# Patient Record
Sex: MALE | Race: WHITE
[De-identification: names, ages, dates, MRNs, and addresses within clinical notes are randomized per-mention and may not be internally consistent; named-entity substitution may affect disease eponyms.]

---

## 2017-05-16 ENCOUNTER — HOSPITAL ENCOUNTER (EMERGENCY)
Dept: HOSPITAL 17 - NEPK | Age: 22
LOS: 1 days | Discharge: HOME | End: 2017-05-17
Payer: SELF-PAY

## 2017-05-16 VITALS — WEIGHT: 167.55 LBS | BODY MASS INDEX: 31.63 KG/M2 | HEIGHT: 61 IN

## 2017-05-16 VITALS
DIASTOLIC BLOOD PRESSURE: 71 MMHG | OXYGEN SATURATION: 100 % | TEMPERATURE: 98.7 F | SYSTOLIC BLOOD PRESSURE: 118 MMHG | RESPIRATION RATE: 16 BRPM | HEART RATE: 90 BPM

## 2017-05-16 DIAGNOSIS — K02.9: ICD-10-CM

## 2017-05-16 DIAGNOSIS — F17.210: ICD-10-CM

## 2017-05-16 DIAGNOSIS — K08.89: Primary | ICD-10-CM

## 2017-05-16 PROCEDURE — 96372 THER/PROPH/DIAG INJ SC/IM: CPT

## 2017-05-16 PROCEDURE — 99282 EMERGENCY DEPT VISIT SF MDM: CPT

## 2017-05-16 NOTE — PD
Physical Exam


Time Seen by Provider:  22:28


Narrative


21 y/o male here with dental pain for 4 months, worse for the past two weeks.








Vital signs reviewed.


Seen at triage desk.  Awaiting bed placement.





Data


Data


Last Documented VS








Vital Signs








  Date Time  Temp Pulse Resp B/P Pulse Ox O2 Delivery O2 Flow Rate FiO2


 


5/16/17 22:28 98.7 90 16 118/71 100   














MDM


Medical Record Reviewed:  Yes


Supervised Visit with AVERY:  No


Scripts


No Active Prescriptions or Reported Meds








Everett Mosqueda May 16, 2017 22:29

## 2017-05-16 NOTE — PD
HPI


Chief Complaint:  Oral / Dental Pain or Problem


Time Seen by Provider:  23:08


Travel History


International Travel<30 days:  No


Contact w/Intl Traveler<30days:  No


Traveled to known affect area:  No





History of Present Illness


HPI


22-year-old male presents to [facility] department for evaluation of dental pain.  

Patient states it is associated with his wisdom teeth.  He states that his wasn'

t either growing in incorrectly causing his teeth to crack and have cavities.  

This is resulting in him having significant pain.  Patient states his pain has 

been ongoing intermittently for a long time but worse over the last 5-6 days.  

He states it is preventing him from going to work.  Pain is a 10 out of 10, 

throbbing, constant.  Denies any trauma.  He has no other symptoms to report.





PFSH


Past Medical History


ADD:  Yes


ADHD:  Yes (ADHD)


Anxiety:  Yes


Depression:  Yes


Cancer:  No


Cardiovascular Problems:  No


Developmental Delay:  No


Diabetes:  No


Diminished Hearing:  No


Psychiatric:  Yes (PTSD, ANXIETY, DEPRESSION)


Immunizations Current:  Yes


Migraines:  No


Seizures:  No


Thyroid Disease:  No


Ulcer:  No





Past Surgical History


Oral Surgery:  Yes (DENTAL)


Other Surgery:  No





Social History


Alcohol Use:  No


Tobacco Use:  Yes (1/2 PACK DAILY)


Substance Use:  No





Allergies-Medications


(Allergen,Severity, Reaction):  


Coded Allergies:  


     Sulfa (Verified  Allergy, Severe, RASH, 5/16/17)


Reported Meds & Prescriptions





Reported Meds & Active Scripts


Active


Ultram (Tramadol HCl) 50 Mg Tab 50 Mg PO Q6H PRN


Ibuprofen 600 Mg Tab 600 Mg PO Q8HR PRN


Penicillin V Potassium 500 Mg Tab 500 Mg PO Q6H 10 Days


Peridex Liq (Chlorhexidine Gluconate (Mouth) Liq) 0.12% Soln 15 Ml SWISH-SPIT 

BID








Review of Systems


Except as stated in HPI:  all other systems reviewed are Neg





Physical Exam


Narrative


GENERAL: Well-nourished, well-developed male patient in no acute distress


SKIN: Focused skin assessment warm/dry.


HEAD: Normocephalic.  Without erythema or edema


EYES: No scleral icterus. No injection or drainage. 


DENTAL: No loose teeth.  Patient has significantly decayed third molars 

bilateral top and bottom.  Gingiva is erythematous and edematous without any 

appreciable abscess.  No malocclusion.


NECK: Supple, trachea midline. No JVD or lymphadenopathy.


CARDIOVASCULAR: Regular rate and rhythm without murmurs, gallops, or rubs. 


RESPIRATORY: Breath sounds equal bilaterally. No accessory muscle use.


MUSCULOSKELETAL: No cyanosis, or edema. 


BACK: Nontender without obvious deformity. No CVA tenderness.





Data


Data


Last Documented VS





Vital Signs








  Date Time  Temp Pulse Resp B/P Pulse Ox O2 Delivery O2 Flow Rate FiO2


 


5/16/17 22:28 98.7 90 16 118/71 100   








Orders








 Ketorolac Inj (Toradol Inj) (5/16/17 23:15)


Penicillin V Potassium (Veetids) (5/16/17 23:15)











[facility]


Medical Decision Making


Medical Screen Exam Complete:  Yes


Emergency Medical Condition:  Yes


Medical Record Reviewed:  Yes


Differential Diagnosis


Dental caries versus dentalgia versus periodontal disease versus pulpitis 

versus gingivitis


Narrative Course


22-year-old male presents to emergency department for evaluation.  Patient does 

have significantly decayed molars with associated gingival erythema and edema.  

He'll be started on oral antibiotic for this as well as some pain control.  He 

is encouraged to seek dental evaluation and return immediately with any acute 

worsening of symptoms.





Diagnosis





 Primary Impression:  


 Dentalgia


 Additional Impression:  


 Dental caries into pulp


Referrals:  


Dentist





Primary Care Physician


Patient Instructions:  Dental Caries (DC), General Instructions


Departure Forms:  Tests/Procedures, Work Release   Enter return to work date:  

May 18, 2017





***Additional Instructions:


It is important that you seek dental evaluation


Follow-up with primary care provider


Return immediately with any acute worsening symptoms


***Med/Other Pt SpecificInfo:  Prescription(s) given


Scripts


Tramadol (Ultram)50 Mg Tab50 Mg PO Q6H PRN (PAIN GREATER THAN 5) #12 TAB  Ref 0


   Prov:Augustine Ramey MD         5/16/17 


Ibuprofen 600 Mg Ofv117 Mg PO Q8HR PRN (PAIN) #30 TAB  Ref 0


   Prov:Beth Woodard         5/16/17 


Penicillin V Potassium 500 Mg Hxs642 Mg PO Q6H  10 Days  Ref 0


   Prov:Beth Woodard         5/16/17 


Chlorhexidine Gluconate (Mouth) Liq (Peridex Liq)0.12% Soln15 Ml SWISH-SPIT BID

  #473 ML  Ref 0


   Prov:Beth Woodard         5/16/17


Disposition:  01 DISCHARGE HOME


Condition:  Stable








Beth Woodard May 16, 2017 23:08

## 2017-07-22 ENCOUNTER — HOSPITAL ENCOUNTER (EMERGENCY)
Dept: HOSPITAL 17 - NEPD | Age: 22
LOS: 1 days | Discharge: HOME | End: 2017-07-23
Payer: COMMERCIAL

## 2017-07-22 VITALS
OXYGEN SATURATION: 99 % | DIASTOLIC BLOOD PRESSURE: 70 MMHG | SYSTOLIC BLOOD PRESSURE: 125 MMHG | TEMPERATURE: 98 F | HEART RATE: 60 BPM | RESPIRATION RATE: 16 BRPM

## 2017-07-22 DIAGNOSIS — K08.89: Primary | ICD-10-CM

## 2017-07-22 DIAGNOSIS — F17.200: ICD-10-CM

## 2017-07-22 PROCEDURE — 99284 EMERGENCY DEPT VISIT MOD MDM: CPT

## 2017-07-23 NOTE — PD
HPI


Chief Complaint:  Oral / Dental Pain or Problem


Time Seen by Provider:  00:10


Travel History


International Travel<30 days:  No


Contact w/Intl Traveler<30days:  No


Traveled to known affect area:  No





History of Present Illness


HPI


Is a 22-year-old man who presents to the emergency department complaining of 

severe oral dental pain in the upper right third molar.  He's had pain in this 

area before.  He believes his wisdom teeth are coming in.  Pains been worsening 

over the past 2 days or so.  No fever.  No drainage.  No other complaints.





History


Past Medical History


Medical History:  Denies Significant Hx





Social History


Alcohol Use:  No


Tobacco Use:  Yes (1/2 PACK DAILY)





Allergies-Medications


(Allergen,Severity, Reaction):  


Coded Allergies:  


     Sulfa (Verified  Allergy, Severe, RASH, 5/16/17)


Reported Meds & Prescriptions





Reported Meds & Active Scripts


Active


Ultram (Tramadol HCl) 50 Mg Tab 50 Mg PO Q6H PRN


Ibuprofen 600 Mg Tab 600 Mg PO Q8HR PRN


Penicillin V Potassium 500 Mg Tab 500 Mg PO Q6H 10 Days


Peridex Liq (Chlorhexidine Gluconate (Mouth) Liq) 0.12% Soln 15 Ml SWISH-SPIT 

BID








Review of Systems


Except as stated in HPI:  all other systems reviewed are Neg





Physical Exam


Narrative


GENERAL: Well-appearing 22-year-old man, no acute distress.


SKIN: Focused skin assessment warm/dry.


HEENT: Normal appearance of the face.  Is no obvious asymmetry or swelling.  He 

has a fair amount of dental decay.  In the area of interest in the upper right 

maxilla the third molar appears to be coming in and is somewhat decayed.  There 

is some decay in the second molar as well.  The entire area is tender to 

percussion.  There is no obvious soft tissue swelling or drainage.


NECK: Trachea midline. No JVD. 


Cardiovascular: Warm and well perfused.


Pulmonary: Normal rate and effort.





Data


Data


Last Documented VS





Vital Signs








  Date Time  Temp Pulse Resp B/P Pulse Ox O2 Delivery O2 Flow Rate FiO2


 


7/22/17 23:41 98.0 60 16 125/70 99 Room Air  








Orders





 Hydromorphone Pf Inj (Dilaudid Pf Inj) (7/23/17 00:30)


Ketorolac Inj (Toradol Inj) (7/23/17 00:30)


Amoxicil-Clavulanate (Augmentin) (7/23/17 00:30)








MDM


Medical Decision Making


Medical Screen Exam Complete:  Yes


Emergency Medical Condition:  Yes


Differential Diagnosis


Until pain, decay, abscess, infection, other


Narrative Course


Medical decision-making 





 22-year-old man who presents to the emergency department with dental pain.  No 

obvious abscess or infection.  Given dental resources.  Recommend outpatient 

follow-up.





Diagnosis





 Primary Impression:  


 Dentalgia





***Additional Instructions:


Take Pen-Vee K as prescribed.





Take Lortab as needed for pain.


***Med/Other Pt SpecificInfo:  Prescription(s) given


Scripts


Hydrocodone-Acetaminophen (Lortab)5-325 Mg Tab1-2 Tab PO Q6H PRN (PAIN) #12 TAB


   Prov:Panfilo Rodriguez MD         7/23/17 


Naproxen (Naprosyn)500 Mg Iau131 Mg PO BID PRN (PAIN SCALE 1 TO 10) #20 TAB


   Prov:Panfilo Rodriguez MD         7/23/17 


Penicillin V Potassium 500 Mg Qtq961 Mg PO Q6H  10 Days  Ref 0


   Prov:Panfilo Rodriguez MD         7/23/17


Disposition:  01 DISCHARGE HOME


Condition:  Stable








Panfilo Rodriguez MD Jul 23, 2017 00:25

## 2017-09-20 ENCOUNTER — HOSPITAL ENCOUNTER (EMERGENCY)
Dept: HOSPITAL 17 - NEPK | Age: 22
Discharge: HOME | End: 2017-09-20
Payer: COMMERCIAL

## 2017-09-20 VITALS
DIASTOLIC BLOOD PRESSURE: 82 MMHG | TEMPERATURE: 97.5 F | HEART RATE: 74 BPM | RESPIRATION RATE: 15 BRPM | SYSTOLIC BLOOD PRESSURE: 115 MMHG | OXYGEN SATURATION: 99 %

## 2017-09-20 DIAGNOSIS — K02.9: Primary | ICD-10-CM

## 2017-09-20 DIAGNOSIS — Z72.0: ICD-10-CM

## 2017-09-20 DIAGNOSIS — K08.89: ICD-10-CM

## 2017-09-20 PROCEDURE — 96372 THER/PROPH/DIAG INJ SC/IM: CPT

## 2017-09-20 PROCEDURE — 99284 EMERGENCY DEPT VISIT MOD MDM: CPT

## 2017-09-20 NOTE — PD
HPI


Chief Complaint:  Oral / Dental Pain or Problem


Time Seen by Provider:  21:24


Travel History


International Travel<30 days:  No


Contact w/Intl Traveler<30days:  No


Traveled to known affect area:  No





History of Present Illness


HPI


22 year-old male presents to emergency department for evaluation of severe 

dental pain, involving his wisdom teeth.  States that they have not grown 

incorrectly in her causing him severe pain.  He  states that he has been here 

before, in fact he has been here 3 times for this.  He reportedly did not fill 

his prescriptions on his last visit because he stated he was busy working.  

Denies fever or chills.  No new trauma.  No difficulty eating.  Pain is a 

constant, throbbing, 10 out of 10.  No other symptoms to report.





PFSH


Past Medical History


ADD:  Yes


ADHD:  Yes (ADHD)


Anxiety:  Yes


Depression:  Yes


Cancer:  No


Cardiovascular Problems:  No


Developmental Delay:  No


Diabetes:  No


Diminished Hearing:  No


Psychiatric:  Yes (PTSD, ANXIETY, DEPRESSION)


Immunizations Current:  Yes


Migraines:  No


Seizures:  No


Thyroid Disease:  No


Ulcer:  No





Past Surgical History


Oral Surgery:  Yes (DENTAL)


Other Surgery:  No





Social History


Alcohol Use:  No


Tobacco Use:  Yes (1 PACK DAILY)


Substance Use:  No





Allergies-Medications


(Allergen,Severity, Reaction):  


Coded Allergies:  


     Sulfa (Sulfonamide Antibiotics) (Unverified  Allergy, Severe, RASH, 9/20/17

)


Reported Meds & Prescriptions





Reported Meds & Active Scripts


Active


Ibuprofen 800 Mg Tab 800 Mg PO Q8H PRN


Ultram (Tramadol HCl) 50 Mg Tab 50 Mg PO Q8H PRN


Penicillin V Potassium 500 Mg Tab 500 Mg PO Q6H 10 Days


Peridex Liq (Chlorhexidine Gluconate (Mouth) Liq) 0.12% Soln 15 Ml SWISH-SPIT 

BID 14 Days


Lortab (Hydrocodone-Acetaminophen) 5-325 Mg Tab 1-2 Tab PO Q6H PRN


Naprosyn (Naproxen) 500 Mg Tab 500 Mg PO BID PRN


Penicillin V Potassium 500 Mg Tab 500 Mg PO Q6H 10 Days


Ultram (Tramadol HCl) 50 Mg Tab 50 Mg PO Q6H PRN


Ibuprofen 600 Mg Tab 600 Mg PO Q8HR PRN


Peridex Liq (Chlorhexidine Gluconate (Mouth) Liq) 0.12% Soln 15 Ml SWISH-SPIT 

BID








Review of Systems


Except as stated in HPI:  all other systems reviewed are Neg





Physical Exam


Narrative


GENERAL: Well-nourished, well-developed male patient in no acute distress


SKIN: Focused skin assessment warm/dry.


HEAD: Normocephalic.  No erythema or edema


EYES: No scleral icterus. No injection or drainage. 


DENTAL: No loose or chipped teeth.  Patient does have dental carry on the top 

right third molar.  There is associated gingival erythema and edema.  No 

appreciable abscess.  No malocclusion.


NECK: Supple, trachea midline. No JVD or lymphadenopathy.


CARDIOVASCULAR: Regular rate and rhythm without murmurs, gallops, or rubs. 


RESPIRATORY: Breath sounds equal bilaterally. No accessory muscle use.





Data


Data


Last Documented VS





Vital Signs








  Date Time  Temp Pulse Resp B/P (MAP) Pulse Ox O2 Delivery O2 Flow Rate FiO2


 


9/20/17 20:46 97.5 74 15 115/82 (93) 99 Room Air  








Orders





 Orders


Ketorolac Inj (Toradol Inj) (9/20/17 21:30)








MDM


Medical Decision Making


Medical Screen Exam Complete:  Yes


Emergency Medical Condition:  Yes


Medical Record Reviewed:  Yes


Differential Diagnosis


Gingivitis versus pulpitis versus periodontal disease versus dental caries


Narrative Course


22-year-old male presents to emergency department for evaluation of dental 

pain.  Patient has been here 3 times for this.  States he did not fill his 

medication last time.  He has a gingival erythema and edema with no appreciable 

abscess.  I told him the only way that this is going to ever improve his to 

seek the appropriate treatment and follow-up with a dentist and perhaps an oral 

surgeon if it is his wisdom teeth.  He verbalizes understanding.  He agrees to 

return immediately with any acute worsening of symptoms.





Diagnosis





 Primary Impression:  


 Dental caries into pulp


 Additional Impression:  


 Dentalgia


Referrals:  


Dentist





Primary Care Physician


Patient Instructions:  Dental Caries (ED), General Instructions





***Additional Instructions:  


Follow-up with a dentist


Return immediately to the emergency department with any acute worsening of 

symptoms


***Med/Other Pt SpecificInfo:  Prescription(s) given


Scripts


Ibuprofen (Ibuprofen) 800 Mg Tab


800 MG PO Q8H Y for Pain/Inflammation, #30 TAB 0 Refills


   Prov: Beth Woodard         9/20/17 


Tramadol (Ultram) 50 Mg Tab


50 MG PO Q8H Y for PAIN GREATER THAN 6, #15 TAB 0 Refills


   Prov: Beth Woodard         9/20/17 


Penicillin V Potassium (Penicillin V Potassium) 500 Mg Tab


500 MG PO Q6H for Infection for 10 Days, #40 TAB 0 Refills


   Prov: Beth Woodard         9/20/17 


Chlorhexidine Gluconate (Mouth) Liq (Peridex Liq) 0.12% Soln


15 ML SWISH-SPIT BID for 14 Days, #420 ML 0 Refills


   Prov: Beth Woodard         9/20/17


Disposition:  01 DISCHARGE HOME


Condition:  Stable











Beth Woodard Sep 20, 2017 21:31

## 2017-10-09 ENCOUNTER — HOSPITAL ENCOUNTER (EMERGENCY)
Dept: HOSPITAL 17 - NEPK | Age: 22
LOS: 1 days | Discharge: LEFT BEFORE DISCHARGE | End: 2017-10-10
Payer: COMMERCIAL

## 2017-10-09 VITALS
HEART RATE: 76 BPM | RESPIRATION RATE: 16 BRPM | TEMPERATURE: 99 F | DIASTOLIC BLOOD PRESSURE: 57 MMHG | OXYGEN SATURATION: 97 % | SYSTOLIC BLOOD PRESSURE: 118 MMHG

## 2017-10-09 VITALS — WEIGHT: 163.14 LBS | BODY MASS INDEX: 21.62 KG/M2 | HEIGHT: 73 IN

## 2017-10-09 DIAGNOSIS — Z53.21: ICD-10-CM

## 2017-10-09 DIAGNOSIS — K02.9: Primary | ICD-10-CM

## 2017-10-09 PROCEDURE — 99281 EMR DPT VST MAYX REQ PHY/QHP: CPT

## 2017-10-10 NOTE — PD
HPI


Chief Complaint:  Oral / Dental Pain or Problem


Time Seen by Provider:  00:14


Travel History


International Travel<30 days:  No


Contact w/Intl Traveler<30days:  No


Traveled to known affect area:  No





History of Present Illness


HPI


Patient is a 22-year-old male presented to the emergency room evaluation of 

right upper tooth pain.  Patient states that his wisdom teeth are coming in 

causing his other teeth to crowd and turn black.  Patient states the pain is 

aching and throbbing in states today out of 10.  He reports taking 800 mg of 

ibuprofen 2 hours prior to arrival with no significant relief.  Patient states 

the pain started a few days ago.  He denies any fever, chills, nausea or 

vomiting, visual changes.





PFSH


Past Medical History


ADD:  Yes


ADHD:  Yes (ADHD)


Anxiety:  Yes


Depression:  Yes


Cancer:  No


Cardiovascular Problems:  No


Developmental Delay:  No


Diabetes:  No


Diminished Hearing:  No


Psychiatric:  Yes (PTSD, ANXIETY, DEPRESSION)


Immunizations Current:  Yes


Migraines:  No


Seizures:  No


Thyroid Disease:  No


Ulcer:  No





Past Surgical History


Oral Surgery:  Yes (DENTAL)


Other Surgery:  No





Social History


Alcohol Use:  No


Tobacco Use:  Yes (1 PACK DAILY)


Substance Use:  No





Allergies-Medications


(Allergen,Severity, Reaction):  


Coded Allergies:  


     Sulfa (Sulfonamide Antibiotics) (Unverified  Allergy, Severe, RASH, 10/9/17

)


Reported Meds & Prescriptions





Reported Meds & Active Scripts


Active


Ibuprofen 800 Mg Tab 800 Mg PO Q8H PRN


Ultram (Tramadol HCl) 50 Mg Tab 50 Mg PO Q8H PRN


Penicillin V Potassium 500 Mg Tab 500 Mg PO Q6H 10 Days


Peridex Liq (Chlorhexidine Gluconate (Mouth) Liq) 0.12% Soln 15 Ml SWISH-SPIT 

BID 14 Days


Lortab (Hydrocodone-Acetaminophen) 5-325 Mg Tab 1-2 Tab PO Q6H PRN


Naprosyn (Naproxen) 500 Mg Tab 500 Mg PO BID PRN


Penicillin V Potassium 500 Mg Tab 500 Mg PO Q6H 10 Days


Ultram (Tramadol HCl) 50 Mg Tab 50 Mg PO Q6H PRN


Ibuprofen 600 Mg Tab 600 Mg PO Q8HR PRN


Peridex Liq (Chlorhexidine Gluconate (Mouth) Liq) 0.12% Soln 15 Ml SWISH-SPIT 

BID








Review of Systems


Except as stated in HPI:  all other systems reviewed are Neg


HENT:  Positive: Dental Difficulties





Physical Exam


Narrative


GENERAL: Thin, well-developed alert  male.


SKIN: Warm and dry.


HEAD: Normocephalic.


MOUTH: Mucous membranes moist, no lesions, tongue and gums appear normal.  

Dental caries noted to the right upper second and third molars.  No abscess no 

edema  on gumline


EYES: No scleral icterus. No injection or drainage. 


NECK: Supple, trachea midline. No JVD or lymphadenopathy.


CARDIOVASCULAR: Regular rate and rhythm without murmurs, gallops, or rubs. 


RESPIRATORY: Breath sounds equal bilaterally. No accessory muscle use.


GASTROINTESTINAL: Abdomen soft, non-tender, nondistended. 


MUSCULOSKELETAL: No cyanosis, or edema. 


BACK: Nontender without obvious deformity. No CVA tenderness.








Data


Data


Last Documented VS





Vital Signs








  Date Time  Temp Pulse Resp B/P (MAP) Pulse Ox O2 Delivery O2 Flow Rate FiO2


 


10/9/17 23:25 99.0 76 16 118/57 (77) 97 Room Air  











[facility]


Medical Decision Making


Medical Screen Exam Complete:  Yes


Emergency Medical Condition:  Yes


Medical Record Reviewed:  Yes


Interpretation(s)





Vital Signs








  Date Time  Temp Pulse Resp B/P (MAP) Pulse Ox O2 Delivery O2 Flow Rate FiO2


 


10/9/17 23:25 99.0 76 16 118/57 (77) 97 Room Air  








Differential Diagnosis


Dentalgia versus dental caries versus abscess versus malingering versus other


Narrative Course


Patient is a 22-year-old male that presented to emergency department for 

evaluation of right upper wisdom tooth pain.  Patient's vital signs are stable.

  Medical records reviewed, patient has been to the emergency department 

several times over the last few months with the same complaint.  He was 

encouraged to follow-up with a dentist each time.  He states that he hasn't had 

time to go to the dentist.  Patient requested pain medication.  Patient was 

advised that he would be offered antibiotics as well as anti-inflammatory 

medication.  He was advised that this is a chronic ongoing issue that needs to 

be evaluated by a dentist specifically.  His mother was with him, patient 

became agitated and left the room.  Discussed with mother holds dental clinics 

that have reduced fees.  She states that she has list on her refrigerator.  She 

was encouraged to offer him ibuprofen over-the-counter 6-800 mg every 6-8 hours 

as needed for pain.  Additionally they can trial over-the-counter Orajel or 

similar agent.





Diagnosis





 Primary Impression:  


 Left against medical advice











Christi Mora Oct 10, 2017 00:19

## 2018-05-01 ENCOUNTER — HOSPITAL ENCOUNTER (EMERGENCY)
Dept: HOSPITAL 17 - PHED | Age: 23
Discharge: HOME | End: 2018-05-01
Payer: COMMERCIAL

## 2018-05-01 VITALS — BODY MASS INDEX: 19.87 KG/M2 | WEIGHT: 149.91 LBS | HEIGHT: 73 IN

## 2018-05-01 VITALS
DIASTOLIC BLOOD PRESSURE: 61 MMHG | OXYGEN SATURATION: 98 % | RESPIRATION RATE: 18 BRPM | HEART RATE: 56 BPM | SYSTOLIC BLOOD PRESSURE: 109 MMHG | TEMPERATURE: 98 F

## 2018-05-01 VITALS — DIASTOLIC BLOOD PRESSURE: 78 MMHG | SYSTOLIC BLOOD PRESSURE: 128 MMHG

## 2018-05-01 DIAGNOSIS — Z88.2: ICD-10-CM

## 2018-05-01 DIAGNOSIS — F43.10: ICD-10-CM
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
## 2018-05-01 DIAGNOSIS — Z23: ICD-10-CM

## 2018-05-01 DIAGNOSIS — S81.811A: Primary | ICD-10-CM

## 2018-05-01 DIAGNOSIS — F17.200: ICD-10-CM

## 2018-05-01 DIAGNOSIS — F90.9: ICD-10-CM

## 2018-05-01 DIAGNOSIS — F32.9: ICD-10-CM

## 2018-05-01 PROCEDURE — 12034 INTMD RPR S/TR/EXT 7.6-12.5: CPT

## 2018-05-01 PROCEDURE — 90471 IMMUNIZATION ADMIN: CPT

## 2018-05-01 PROCEDURE — E0113 CRUTCH UNDERARM EACH WOOD: HCPCS

## 2018-05-01 PROCEDURE — 99283 EMERGENCY DEPT VISIT LOW MDM: CPT

## 2018-05-01 PROCEDURE — 73590 X-RAY EXAM OF LOWER LEG: CPT

## 2018-05-01 PROCEDURE — 90714 TD VACC NO PRESV 7 YRS+ IM: CPT

## 2018-05-01 NOTE — PD
HPI


Chief Complaint:  Laceration/Skin Injury


Time Seen by Provider:  10:03


Travel History


International Travel<30 days:  No


Contact w/Intl Traveler<30days:  No


Traveled to known affect area:  No





History of Present Illness


HPI


Patient is a 23-year-old male who comes in with a laceration to his right leg.  

He says he dropped a piece of sheet metal on it just prior to arrival.  He put 

a belt around his leg, but does not know if it was bleeding very much.  He just 

complains of pain to the area.  He denies any other injuries.  He does not know 

when his last tetanus vaccine was.  He did not take anything for the pain.  

Severity is moderate.





PFSH


Past Medical History


ADD:  Yes


ADHD:  Yes (ADHD)


Anxiety:  Yes


Depression:  Yes


Cancer:  No


Cardiovascular Problems:  No


Developmental Delay:  No


Diabetes:  No


Diminished Hearing:  No


Psychiatric:  Yes (PTSD, ANXIETY, DEPRESSION)


Immunizations Current:  Yes


Migraines:  No


Seizures:  No


Thyroid Disease:  No


Ulcer:  No


Tetanus Vaccination:  Unknown





Past Surgical History


Oral Surgery:  Yes (DENTAL)


Other Surgery:  No





Social History


Alcohol Use:  No


Tobacco Use:  Yes (1 PACK DAILY)


Substance Use:  Yes (DAILY MARIJUANA)





Allergies-Medications


(Allergen,Severity, Reaction):  


Coded Allergies:  


     Sulfa (Sulfonamide Antibiotics) (Unverified  Allergy, Severe, RASH, 10/9/17

)


Reported Meds & Prescriptions





Reported Meds & Active Scripts


Active


No Active Prescriptions or Reported Medications    








Review of Systems


Except as stated in HPI:  all other systems reviewed are Neg


General / Constitutional:  No: Fever, Chills


HENT:  No: Headaches, Lightheadedness


Cardiovascular:  No: Chest Pain or Discomfort


Respiratory:  No: Shortness of Breath


Gastrointestinal:  No: Nausea, Vomiting


Musculoskeletal:  Positive: Pain


Skin:  Positive Other (Laceration)


Neurologic:  No: Weakness, Dizziness





Physical Exam


Narrative


GENERAL: Awake and alert, in no acute distress.


SKIN: 4 cm linear laceration to the right lower shin.  There is exposed tendon.


HEAD: Atraumatic. Normocephalic. 


EYES: Pupils equal and round. No scleral icterus.  


ENT: Mucous membranes pink and moist.


NECK: Trachea midline. No JVD. 


CARDIOVASCULAR: Regular rate and rhythm.  No murmur appreciated.


RESPIRATORY: No accessory muscle use. Clear to auscultation. Breath sounds 

equal bilaterally. 


MUSCULOSKELETAL: No obvious deformities. No clubbing.  No cyanosis.  No edema.  

Full range of motion of the right foot and ankle.  Pedal pulses intact.  

Sensation intact.


NEUROLOGICAL: Awake and alert. No obvious cranial nerve deficits.  Motor 

grossly within normal limits. Normal speech.


PSYCHIATRIC: Appropriate mood and affect; insight and judgment normal.





Data


Data


Last Documented VS





Vital Signs








  Date Time  Temp Pulse Resp B/P (MAP) Pulse Ox O2 Delivery O2 Flow Rate FiO2


 


5/1/18 09:58 98.0 56 18 109/61 (77) 98   








Orders





 Orders


Tibia/Fibula (Ap/Lat) (5/1/18 )


Oxycodone-Acetamin 5-325 Mg (Percocet (5/1/18 10:15)


Lidocai-Epi 1%-1:100,000 Inj (Xylocaine- (5/1/18 10:15)


Tetanus/Diphtheria Tox Adult (Tetanus/Di (5/1/18 10:15)


Crutches (5/1/18 11:31)








MDM


Medical Decision Making


Medical Screen Exam Complete:  Yes


Emergency Medical Condition:  Yes


Medical Record Reviewed:  Yes


Differential Diagnosis


Laceration versus fracture versus tendon injury


Narrative Course


Patient is a 23-year-old male comes in after sustaining a laceration to his 

right leg.  Exam shows a deep wound to the right shin.  There does not appear 

to be any tendon or nerve damage.  He had put a belt around his leg, but it was 

not very tight.  This was removed, there was no bleeding.





X-ray of the leg performed shows no acute abnormalities.  Given pain medicine 

and tetanus vaccine updated.





Wound repaired by RUTH Lopez.





Patient given crutches, because he says it hurts to walk on it.  Given a 

prescription for a few pain pills.  Advised take Tylenol or ibuprofen and apply 

ice.  Advised to return for staple removal.  Advised to return to the ED as 

needed for any worsening symptoms.





Diagnosis





 Primary Impression:  


 Laceration of leg


 Qualified Codes:  S81.811A - Laceration without foreign body, right lower leg, 

initial encounter


Patient Instructions:  General Instructions, Laceration (ED)





***Additional Instructions:  


Return in 12-14 days for staple removal.  Keep your wound clean and dry.  

Return anytime for any signs of infection.  Take Tylenol or ibuprofen and apply 

ice as needed for pain.  He can take a pain pill for severe pain.  Return for 

any worsening symptoms.


Scripts


Hydrocodone-Acetaminophen (Norco) 5 Mg-325 Mg Tab


1 TAB PO Q6H Y for PAIN, #6 TAB 0 Refills


   Prov: Dulce Echeverria MD         5/1/18


Disposition:  01 DISCHARGE HOME


Condition:  Stable











Dulce Echeverria MD May 1, 2018 11:37

## 2018-05-01 NOTE — RADRPT
EXAM DATE/TIME:  05/01/2018 10:16 

 

[facility] COMPARISON:     

No previous studies available for comparison.

 

                     

INDICATIONS :     

Laceration to right anterior ankle, large piece of metal fell on leg.

                     

 

MEDICAL HISTORY :     

None.          

 

SURGICAL HISTORY :     

None.   

 

ENCOUNTER:     

Initial                                        

 

ACUITY:     

1 day      

 

PAIN SCORE:     

10/10

 

LOCATION:     

Right anterior ankle

 

FINDINGS:     

Two view examination of the right tibia demonstrates no evidence of fracture or dislocation.  Bony mi
neralization is normal.  Soft tissue irregularity to the anterior distal leg with adjacent gauze.  No
 metallic or calcific radiopaque soft tissue densities.

 

CONCLUSION:     

1. No fracture seen.

2. No metallic foreign bodies seen.

 

 

 

 Jona Sierra MD on May 01, 2018 at 10:41           

Board Certified Radiologist.

 This report was verified electronically.

## 2018-05-01 NOTE — PD
Physical Exam


Narrative


I was asked by Dr. Echeverria to repair patient's laceration.  Please see her 

documentation for full H&P.





Data


Data


Last Documented VS





Vital Signs








  Date Time  Temp Pulse Resp B/P (MAP) Pulse Ox O2 Delivery O2 Flow Rate FiO2


 


5/1/18 09:58 98.0 56 18 109/61 (77) 98   








Orders





 Orders


Tibia/Fibula (Ap/Lat) (5/1/18 )


Oxycodone-Acetamin 5-325 Mg (Percocet (5/1/18 10:15)


Lidocai-Epi 1%-1:100,000 Inj (Xylocaine- (5/1/18 10:15)


Tetanus/Diphtheria Tox Adult (Tetanus/Di (5/1/18 10:15)








MDM


Supervised Visit with AVERY:  No


Procedures


**Procedure Narrative**


LACERATION REPAIR


LOCATION: Right anterior shin


LENGTH: Approximately 9 cm in total length


NUMBER OF STITCHES/STAPLES: 4 buried sutures and 9 staples to close





REPAIR: Verbal consent was obtained.  The area of the laceration was cleaned 

and prepped.  The laceration was infiltrated with lidocaine with epi.  The 

wound was copiously irrigated and explored without evidence of foreign body, 

bony involvement, ligament injury, tendon injury, or neurovascular injury.  The 

wound was closed using 4-0 Vicryl buried and staples to close. This was a 2 

layer repair. A sterile dressing was applied by nurse. The patient was advised 

to keep the affected area as clean and dry as possible using soap and water.  

There were no complications. Patient tolerated the procedure well.


Scripts


No Active Prescriptions or Reported Meds











Jaden Lopez May 1, 2018 11:23

## 2018-05-15 ENCOUNTER — HOSPITAL ENCOUNTER (EMERGENCY)
Dept: HOSPITAL 17 - NEPK | Age: 23
Discharge: HOME | End: 2018-05-15
Payer: COMMERCIAL

## 2018-05-15 VITALS
OXYGEN SATURATION: 98 % | RESPIRATION RATE: 16 BRPM | HEART RATE: 90 BPM | TEMPERATURE: 99 F | DIASTOLIC BLOOD PRESSURE: 67 MMHG | SYSTOLIC BLOOD PRESSURE: 137 MMHG

## 2018-05-15 VITALS — HEIGHT: 73 IN | BODY MASS INDEX: 21.91 KG/M2 | WEIGHT: 165.35 LBS
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
## 2018-05-15 DIAGNOSIS — S81.811D: Primary | ICD-10-CM

## 2018-05-15 DIAGNOSIS — Z48.02: ICD-10-CM

## 2018-05-15 PROCEDURE — 99281 EMR DPT VST MAYX REQ PHY/QHP: CPT

## 2018-05-15 NOTE — PD
HPI


Chief Complaint:  Wound/Suture/Staple Re-Check


Time Seen by Provider:  20:31


Travel History


International Travel<30 days:  No


Contact w/Intl Traveler<30days:  No


Traveled to known affect area:  No





History of Present Illness


HPI


Patient comes emergency department for possible removal staples were placed 14 

days ago to his right anterior shin.  Patient reports he has been trying to 

keep it dry and clean but continues to work as a  and continues to 

have small amount of drainage.  Patient reports continues to have tenderness 

around the site is worse to palpation in certain movement.  Severity mild.  

Denies any fevers or other concerns.





PFSH


Past Medical History


ADD:  Yes


ADHD:  Yes (ADHD)


Anxiety:  Yes


Depression:  Yes


Cancer:  No


Cardiovascular Problems:  No


Developmental Delay:  No


Diabetes:  No


Diminished Hearing:  No


Psychiatric:  Yes (PTSD, ANXIETY, DEPRESSION)


Respiratory:  Yes (spontanious pnuomothorax)


Immunizations Current:  Yes


Migraines:  No


Seizures:  No


Thyroid Disease:  No


Ulcer:  No


Tetanus Vaccination:  < 5 Years


Influenza Vaccination:  No





Past Surgical History


Oral Surgery:  Yes (DENTAL)


Other Surgery:  Yes (chest tube from spontanious pnuomothorax)





Social History


Alcohol Use:  Yes (occasionally)


Tobacco Use:  Yes (1 PACK DAILY)


Substance Use:  Yes (DAILY MARIJUANA)





Allergies-Medications


(Allergen,Severity, Reaction):  


Coded Allergies:  


     Sulfa (Sulfonamide Antibiotics) (Unverified  Allergy, Severe, RASH, 5/15/18

)


Reported Meds & Prescriptions





Reported Meds & Active Scripts


Active


Keflex (Cephalexin) 500 Mg Cap 500 Mg PO Q8H


Norco (Hydrocodone-Acetaminophen) 5 Mg-325 Mg Tab 1 Tab PO Q6H PRN








Review of Systems


Except as stated in HPI:  all other systems reviewed are Neg





Physical Exam


Narrative


GENERAL: Well-developed, well nourished, in no acute distress, and non-ill 

appearing.


SKIN: Focused skin assessment warm and dry.  Well-healing laceration over 

anterior shin.  Minimally tender.  No crepitus.  No drainage noted.  Small area 

of dehiscence noted lateral aspect.


HEAD: Atraumatic. Normocephalic. 


EYES: Pupils equal and round. EOMI. No scleral icterus. No injection or 

drainage. 


ENT: No nasal bleeding or discharge.  Mucous membranes pink and moist.


NECK: Trachea midline. Supple.  No nuclear rigidity.


CARDIOVASCULAR: Regular rate and rhythm.  No murmur appreciated.


RESPIRATORY: No accessory muscle use.  No respiratory distress. 


MUSCULOSKELETAL: No obvious deformities. No clubbing.  No cyanosis.  No edema.  

Full range of motion.


NEUROLOGICAL: Awake and alert. No obvious cranial nerve deficits.  Motor 

grossly within normal limits. Normal speech.


PSYCHIATRIC: Appropriate mood and affect; insight and judgment normal.





Data


Data


Last Documented VS





Vital Signs








  Date Time  Temp Pulse Resp B/P (MAP) Pulse Ox O2 Delivery O2 Flow Rate FiO2


 


5/15/18 20:17 99.0 90 16 137/67 (90) 98   








Orders





 Orders


Ed Discharge Order (5/15/18 20:35)








MDM


Medical Decision Making


Medical Screen Exam Complete:  Yes


Emergency Medical Condition:  Yes


Differential Diagnosis


Wound check, wound infection, staple removal


Narrative Course


Patient in no obvious distress upon re-evaluation.  Patient was placed on 

antibiotics prophylactically secondary to small area of partial wound 

dehiscence and patient continues to expose wound to the elements. Patient was 

asked if they wanted to speak to my attending, which the patient did not wish 

to do at this time.  Any questions/concerns in reference to patient diagnosis/

condition discussed and clarified prior to patient's discharge. Reinforced 

sheer importance of close follow up with patient's primary physician or primary 

care clinic or to return here in 7 days to have the last 2 staples removed. 

Instructed patient to return to ED immediately, if symptoms return/worsen. 

Patient showed understanding of above instructions.  Further instructions and 

recommendations were detailed in discharge paperwork.  Patient ambulated 

without difficulty out of ED at discharge.





Procedures


**Procedure Narrative**


Verbal consent was obtained.  7 staples removed.  2 staples were left and 

around area of dehiscence patient is instructed to return in a week to have 

those final to removed.  Patient tolerated procedure well.  There was no 

complications.





Diagnosis





 Primary Impression:  


 Encounter for wound re-check


 Additional Impression:  


 Removal of staples


Patient Instructions:  Acute Wounds (DC), General Instructions, Stitches 

Removal (DC)





***Additional Instructions:  


Follow-up with your primary care physician or return here in 1 week to have 

last 2 staples removed.  Take all medication as prescribed.  Keep wound dry and 

clean as possible using soap and water.  Use Neosporin to promote healing.  Do 

not soak or submerge wound.  Return to the emergency department if symptoms get 

worse.


***Med/Other Pt SpecificInfo:  Prescription(s) given


Scripts


Cephalexin (Keflex) 500 Mg Cap


500 MG PO Q8H for Infection, #30 CAP 0 Refills


   Prov: Renny Kraus MD         5/15/18


Disposition:  01 DISCHARGE HOME


Condition:  Stable











Jaden Lopez May 15, 2018 20:34

## 2018-05-20 ENCOUNTER — HOSPITAL ENCOUNTER (EMERGENCY)
Dept: HOSPITAL 17 - PHEFT | Age: 23
Discharge: HOME | End: 2018-05-20
Payer: COMMERCIAL

## 2018-05-20 VITALS
DIASTOLIC BLOOD PRESSURE: 65 MMHG | TEMPERATURE: 98.1 F | SYSTOLIC BLOOD PRESSURE: 143 MMHG | RESPIRATION RATE: 18 BRPM | HEART RATE: 100 BPM | OXYGEN SATURATION: 97 %

## 2018-05-20 VITALS
OXYGEN SATURATION: 98 % | SYSTOLIC BLOOD PRESSURE: 100 MMHG | DIASTOLIC BLOOD PRESSURE: 46 MMHG | TEMPERATURE: 98 F | HEART RATE: 73 BPM | RESPIRATION RATE: 18 BRPM

## 2018-05-20 VITALS — HEIGHT: 72 IN | WEIGHT: 152.56 LBS | BODY MASS INDEX: 20.66 KG/M2

## 2018-05-20 VITALS — RESPIRATION RATE: 18 BRPM

## 2018-05-20 DIAGNOSIS — F32.9: ICD-10-CM

## 2018-05-20 DIAGNOSIS — B95.61: ICD-10-CM

## 2018-05-20 DIAGNOSIS — F90.9: ICD-10-CM

## 2018-05-20 DIAGNOSIS — L03.119: Primary | ICD-10-CM

## 2018-05-20 DIAGNOSIS — F17.200: ICD-10-CM

## 2018-05-20 DIAGNOSIS — F43.10: ICD-10-CM

## 2018-05-20 LAB
ALBUMIN SERPL-MCNC: 3.9 GM/DL (ref 3.4–5)
ALP SERPL-CCNC: 74 U/L (ref 45–117)
ALT SERPL-CCNC: 22 U/L (ref 12–78)
AST SERPL-CCNC: 11 U/L (ref 15–37)
BASOPHILS # BLD AUTO: 0.2 TH/MM3 (ref 0–0.2)
BASOPHILS NFR BLD: 2.2 % (ref 0–2)
BILIRUB SERPL-MCNC: 0.4 MG/DL (ref 0.2–1)
BUN SERPL-MCNC: 18 MG/DL (ref 7–18)
CALCIUM SERPL-MCNC: 8.4 MG/DL (ref 8.5–10.1)
CHLORIDE SERPL-SCNC: 106 MEQ/L (ref 98–107)
CREAT SERPL-MCNC: 1 MG/DL (ref 0.6–1.3)
EOSINOPHIL # BLD: 0.5 TH/MM3 (ref 0–0.4)
EOSINOPHIL NFR BLD: 5.5 % (ref 0–4)
ERYTHROCYTE [DISTWIDTH] IN BLOOD BY AUTOMATED COUNT: 12.4 % (ref 11.6–17.2)
GFR SERPLBLD BASED ON 1.73 SQ M-ARVRAT: 94 ML/MIN (ref 89–?)
GLUCOSE SERPL-MCNC: 115 MG/DL (ref 74–106)
HCO3 BLD-SCNC: 27.2 MEQ/L (ref 21–32)
HCT VFR BLD CALC: 43 % (ref 39–51)
HGB BLD-MCNC: 14.6 GM/DL (ref 13–17)
INR PPP: 1 RATIO
LYMPHOCYTES # BLD AUTO: 2.9 TH/MM3 (ref 1–4.8)
LYMPHOCYTES NFR BLD AUTO: 29.5 % (ref 9–44)
MCH RBC QN AUTO: 30 PG (ref 27–34)
MCHC RBC AUTO-ENTMCNC: 34 % (ref 32–36)
MCV RBC AUTO: 88.4 FL (ref 80–100)
MONOCYTE #: 0.7 TH/MM3 (ref 0–0.9)
MONOCYTES NFR BLD: 7.3 % (ref 0–8)
NEUTROPHILS # BLD AUTO: 5.4 TH/MM3 (ref 1.8–7.7)
NEUTROPHILS NFR BLD AUTO: 55.5 % (ref 16–70)
PLATELET # BLD: 266 TH/MM3 (ref 150–450)
PMV BLD AUTO: 8.2 FL (ref 7–11)
PROT SERPL-MCNC: 6.9 GM/DL (ref 6.4–8.2)
PROTHROMBIN TIME: 10.4 SEC (ref 9.8–11.6)
RBC # BLD AUTO: 4.86 MIL/MM3 (ref 4.5–5.9)
SODIUM SERPL-SCNC: 140 MEQ/L (ref 136–145)
WBC # BLD AUTO: 9.7 TH/MM3 (ref 4–11)

## 2018-05-20 PROCEDURE — 87040 BLOOD CULTURE FOR BACTERIA: CPT

## 2018-05-20 PROCEDURE — 96365 THER/PROPH/DIAG IV INF INIT: CPT

## 2018-05-20 PROCEDURE — 96366 THER/PROPH/DIAG IV INF ADDON: CPT

## 2018-05-20 PROCEDURE — 96375 TX/PRO/DX INJ NEW DRUG ADDON: CPT

## 2018-05-20 PROCEDURE — 85730 THROMBOPLASTIN TIME PARTIAL: CPT

## 2018-05-20 PROCEDURE — 87205 SMEAR GRAM STAIN: CPT

## 2018-05-20 PROCEDURE — 73590 X-RAY EXAM OF LOWER LEG: CPT

## 2018-05-20 PROCEDURE — 87070 CULTURE OTHR SPECIMN AEROBIC: CPT

## 2018-05-20 PROCEDURE — 80053 COMPREHEN METABOLIC PANEL: CPT

## 2018-05-20 PROCEDURE — 86403 PARTICLE AGGLUT ANTBDY SCRN: CPT

## 2018-05-20 PROCEDURE — 85025 COMPLETE CBC W/AUTO DIFF WBC: CPT

## 2018-05-20 PROCEDURE — 85610 PROTHROMBIN TIME: CPT

## 2018-05-20 PROCEDURE — 87077 CULTURE AEROBIC IDENTIFY: CPT

## 2018-05-20 PROCEDURE — 99284 EMERGENCY DEPT VISIT MOD MDM: CPT

## 2018-05-20 PROCEDURE — 87186 SC STD MICRODIL/AGAR DIL: CPT

## 2018-05-20 NOTE — RADRPT
EXAM DATE/TIME:  05/20/2018 20:42 

 

[facility] COMPARISON:     

TIBIA/FIBULA RIGHT (AP/LAT), May 01, 2018, 10:16.

 

                     

INDICATIONS :     

Recheck of laceration to distal right tibia by jonah.

                     

 

MEDICAL HISTORY :     

None.          

 

SURGICAL HISTORY :     

None.   

 

ENCOUNTER:     

Subsequent                                        

 

ACUITY:     

2 weeks      

 

PAIN SCORE:     

10/10

 

LOCATION:     

Right distal Tibia

 

FINDINGS:     

Two view examination of the right tibia demonstrates no evidence of fracture or dislocation.  Redemon
stration of soft tissue laceration in the distal ankle. No radiopaque foreign bodies. Bony mineraliza
tion is normal.  The soft tissue structures are intact.

 

CONCLUSION:     

1. Redemonstration of soft tissue laceration in the distal ankle without acute fracture or radiopaque
 foreign bodies. 

 

 

 René Sosa MD on May 20, 2018 at 21:38           

Board Certified Radiologist.

 This report was verified electronically.

## 2018-05-20 NOTE — PD
HPI


Chief Complaint:  Wound/Suture/Staple Re-Check


Time Seen by Provider:  20:11


Travel History


International Travel<30 days:  No


Contact w/Intl Traveler<30days:  No


Traveled to known affect area:  No





History of Present Illness


HPI


Patient is a 23-year-old male who returns to the emergency room for wound 

check.  Patient reports that he was seen in the emergency room on May 1, 2018 

after he accidentally suffered a laceration to his right anterior lower shin 

from a chainsaw.  Patient reports that he had 9 staples placed at that time.  

Patient returned to the hospital on May 15, 2018 for suture removal, 7 sutures 

removed, to remain as patient had wound dehiscence.  There are concerns as 

there was drainage from the wounds and wound dehiscence, patient was started on 

Keflex as an outpatient.  Patient reports that he did not start on antibiotics 

as an outpatient as he did not have time to  a prescription and actually 

lost the prescription.  Patient reports that he has noticed increased drainage 

from his wound, reports increased pain to his wound as well.  Patient denies 

any fevers or chills, reports that tetanus is up-to-date.





PFSH


Past Medical History


ADD:  Yes


ADHD:  Yes (ADHD)


Anxiety:  Yes


Depression:  Yes


Cancer:  No


Cardiovascular Problems:  No


Developmental Delay:  No


Diabetes:  No


Diminished Hearing:  No


Psychiatric:  Yes (PTSD, ANXIETY, DEPRESSION)


Respiratory:  Yes (spontanious pnuomothorax)


Immunizations Current:  Yes


Migraines:  No


Seizures:  No


Thyroid Disease:  No


Ulcer:  No


Pregnant?:  Not Pregnant





Past Surgical History


Oral Surgery:  Yes (DENTAL)


Other Surgery:  Yes (chest tube from spontanious pnuomothorax)





Social History


Alcohol Use:  Yes (occasionally)


Tobacco Use:  Yes (1 PACK DAILY)


Substance Use:  Yes (DAILY MARIJUANA)





Allergies-Medications


(Allergen,Severity, Reaction):  


Coded Allergies:  


     Sulfa (Sulfonamide Antibiotics) (Unverified  Allergy, Severe, RASH, 5/20/18

)


Reported Meds & Prescriptions





Reported Meds & Active Scripts


Active


Keflex (Cephalexin) 500 Mg Cap 500 Mg PO Q8H








Review of Systems


General / Constitutional:  No: Fever, Chills


Eyes:  No: Visual changes


HENT:  No: Headaches


Cardiovascular:  No: Chest Pain or Discomfort


Respiratory:  No: Shortness of Breath


Gastrointestinal:  No: Abdominal Pain


Genitourinary:  No: Dysuria


Musculoskeletal:  No: Pain


Skin:  Positive Other (Wound dehiscence with drainage and erythema), No Rash


Neurologic:  No: Weakness


Psychiatric:  No: Depression


Endocrine:  No: Polydipsia


Hematologic/Lymphatic:  No: Easy Bruising





Physical Exam


Narrative


GENERAL: Mild distress


SKIN: Focused skin assessment warm/dry.


HEAD: Atraumatic. Normocephalic. 


EYES: Pupils equal and round. No scleral icterus. No injection or drainage. 


ENT: No nasal bleeding or discharge.  Mucous membranes pink and moist.


NECK: Trachea midline. No JVD. 


CARDIOVASCULAR: Regular rate and rhythm.  No murmur appreciated.


RESPIRATORY: No accessory muscle use. Clear to auscultation. Breath sounds 

equal bilaterally. 


GASTROINTESTINAL: Abdomen soft, non-tender, nondistended. Hepatic and splenic 

margins not palpable. 


MUSCULOSKELETAL: No obvious deformities. No clubbing.  No cyanosis.  No edema.  

Patient with a 6.5 cm linear area of wound dehiscence to his anterior right 

lower shin, there is no streaking.  There is serosanguineous drainage from his 

wound, pulses intact


NEUROLOGICAL: Awake and alert. No obvious cranial nerve deficits.  Motor 

grossly within normal limits. Normal speech.


PSYCHIATRIC: Appropriate mood and affect; insight and judgment normal.





Data


Data


Last Documented VS





Vital Signs








  Date Time  Temp Pulse Resp B/P (MAP) Pulse Ox O2 Delivery O2 Flow Rate FiO2


 


5/20/18 20:32   20     


 


5/20/18 18:56 98.1 100  143/65 (91) 97   








Orders





 Orders


Wound Culture And Gram Stain (5/20/18 19:50)


Complete Blood Count With Diff (5/20/18 20:23)


Comprehensive Metabolic Panel (5/20/18 20:23)


Prothrombin Time / Inr (Pt) (5/20/18 20:23)


Act Partial Throm Time (Ptt) (5/20/18 20:23)


Blood Culture (5/20/18 20:23)


Iv Access Insert/Monitor (5/20/18 20:23)


Ecg Monitoring (5/20/18 20:23)


Oximetry (5/20/18 20:23)


Morphine Inj (Morphine Inj) (5/20/18 20:30)


Vancomycin Inj (Vancomycin Inj) (5/20/18 20:30)


Tibia/Fibula (Ap/Lat) (5/20/18 )


Vancomycin Inj (Vancomycin Inj) (5/20/18 21:00)


Diphenhydramine Inj (Benadryl Inj) (5/20/18 21:00)


Mupirocin 2% Oint (Bactroban 2% Oint) (5/20/18 21:30)


Potassium Chloride (Kcl) (5/20/18 21:45)





Labs





Laboratory Tests








Test


  5/20/18


20:40


 


White Blood Count 9.7 TH/MM3 


 


Red Blood Count 4.86 MIL/MM3 


 


Hemoglobin 14.6 GM/DL 


 


Hematocrit 43.0 % 


 


Mean Corpuscular Volume 88.4 FL 


 


Mean Corpuscular Hemoglobin 30.0 PG 


 


Mean Corpuscular Hemoglobin


Concent 34.0 % 


 


 


Red Cell Distribution Width 12.4 % 


 


Platelet Count 266 TH/MM3 


 


Mean Platelet Volume 8.2 FL 


 


Neutrophils (%) (Auto) 55.5 % 


 


Lymphocytes (%) (Auto) 29.5 % 


 


Monocytes (%) (Auto) 7.3 % 


 


Eosinophils (%) (Auto) 5.5 % 


 


Basophils (%) (Auto) 2.2 % 


 


Neutrophils # (Auto) 5.4 TH/MM3 


 


Lymphocytes # (Auto) 2.9 TH/MM3 


 


Monocytes # (Auto) 0.7 TH/MM3 


 


Eosinophils # (Auto) 0.5 TH/MM3 


 


Basophils # (Auto) 0.2 TH/MM3 


 


CBC Comment DIFF FINAL 


 


Differential Comment  


 


Prothrombin Time 10.4 SEC 


 


Prothromb Time International


Ratio 1.0 RATIO 


 


 


Activated Partial


Thromboplast Time 28.3 SEC 


 


 


Blood Urea Nitrogen 18 MG/DL 


 


Creatinine 1.00 MG/DL 


 


Random Glucose 115 MG/DL 


 


Total Protein 6.9 GM/DL 


 


Albumin 3.9 GM/DL 


 


Calcium Level 8.4 MG/DL 


 


Alkaline Phosphatase 74 U/L 


 


Aspartate Amino Transf


(AST/SGOT) 11 U/L 


 


 


Alanine Aminotransferase


(ALT/SGPT) 22 U/L 


 


 


Total Bilirubin 0.4 MG/DL 


 


Sodium Level 140 MEQ/L 


 


Potassium Level 3.4 MEQ/L 


 


Chloride Level 106 MEQ/L 


 


Carbon Dioxide Level 27.2 MEQ/L 


 


Anion Gap 7 MEQ/L 


 


Estimat Glomerular Filtration


Rate 94 ML/MIN 


 











[facility]


Medical Decision Making


Medical Screen Exam Complete:  Yes


Emergency Medical Condition:  Yes


Medical Record Reviewed:  Yes


Interpretation(s)





Vital Signs








  Date Time  Temp Pulse Resp B/P (MAP) Pulse Ox O2 Delivery O2 Flow Rate FiO2


 


5/20/18 18:56 98.1 100 18 143/65 (91) 97   








Differential Diagnosis


Wound dehiscence with infection


Narrative Course


Patient is a 23-year-old male who presents the emergency room with complaints 

of wound dehiscence with infection.  Patient was supposed to be started on 

Keflex since May 15, 2018, he has not taken any of his antibiotics as he 1) 

forgot to fill his prescription, 2) lost the prescription.  Patient returns to 

the emergency room for concern of infection.





2 staples were removed from his wounds without difficulty





During the course of the patients emergency department visit, the patients 

history, examination, and differential diagnosis were reviewed with the 

patient. The patient was placed on a cardiac monitor with oximetry and frequent 

blood pressure monitoring. The patient had an IV access obtained and blood work 

sent for analysis.  Blood cultures were sent, x-ray of the tib/fib was ordered





The patient was initially provided IV vancomycin.





The patients laboratory studies were reviewed and remarkable for 





CBC & BMP Diagram


5/20/18 20:40








Total Protein 6.9, Albumin 3.9, Calcium Level 8.4 L, Alkaline Phosphatase 74, 

Aspartate Amino Transf (AST/SGOT) 11 L, Alanine Aminotransferase (ALT/SGPT) 22, 

Total Bilirubin 0.4








Radiology studies were reviewed and remarkable for 





Last Impressions








Tibia/Fibula X-Ray 5/20/18 0000 Signed





Impressions: 





 Service Date/Time:  Tad, May 20, 2018 20:42 - CONCLUSION:  1. 

Redemonstration 





 of soft tissue laceration in the distal ankle without acute fracture or 





 radiopaque foreign bodies.     René Sosa MD 





Patient does not have an elevated white blood cell count, patient has been 

pancultured and has been given a dose of IV vancomycin.  I do not believe that 

the patient needs to be admitted to the hospital this time, plan for trial of 

outpatient treatment with antibiotics as patient never started on his Keflex 

prescribed him the last time he was in the emergency room.  Patient will start 

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 

work cutting down trees.  Plan to have patient return to the emergency room 48 

hours for reevaluation of his wound.  Signs and symptoms of when to return to 

the emergency room was reviewed with the patient in detail.





Procedures


**Procedure Narrative**


Staple removal: 2 staples removed from his right anterior shin without 

difficulty





Diagnosis





 Primary Impression:  


 Cellulitis of leg without foot


Patient Instructions:  General Instructions





***Additional Instructions:  


**Please provide patient with a copy of their lab work and studies at discharge*

*





Please follow up with your primary care doctor in 2-3 days





Return to the ER if symptoms worsen or progress





Return to the ER as needed





Please take all antibiotics as prescribed





Please follow-up with all cultures from today





Please return to the emergency room in 48 hours for reevaluation of your wound





Keep wound clean and dry and covered and apply Bactroban ointment twice a day


***Med/Other Pt SpecificInfo:  Prescription(s) given


Scripts


Mupirocin Topical (Bactroban Topical) 22 Gm Cream


1 APPLIC TOPICAL BID for Mgmt Bacterial Infection, #1 TUBE 0 Refills


   Prov: Amira Bales DO         5/20/18 


Cephalexin (Keflex) 500 Mg Cap


500 MG PO Q6H for Infection for 10 Days, #40 CAP 0 Refills


   Prov: Amira Bales DO         5/20/18 


Doxycycline Hyclate (Doxycycline Hyclate) 100 Mg Cap


100 MG PO BID for Infection, #20 CAP 0 Refills


   Prov: Amira Bales DO         5/20/18


Disposition:  01 DISCHARGE HOME


Condition:  Stable











Amira Bales DO May 20, 2018 20:37